# Patient Record
Sex: FEMALE | Race: WHITE | NOT HISPANIC OR LATINO | Employment: FULL TIME | URBAN - METROPOLITAN AREA
[De-identification: names, ages, dates, MRNs, and addresses within clinical notes are randomized per-mention and may not be internally consistent; named-entity substitution may affect disease eponyms.]

---

## 2022-05-29 ENCOUNTER — HOSPITAL ENCOUNTER (EMERGENCY)
Facility: MEDICAL CENTER | Age: 37
End: 2022-05-29
Attending: EMERGENCY MEDICINE
Payer: COMMERCIAL

## 2022-05-29 VITALS
BODY MASS INDEX: 23.14 KG/M2 | SYSTOLIC BLOOD PRESSURE: 97 MMHG | HEART RATE: 87 BPM | WEIGHT: 138.89 LBS | TEMPERATURE: 98 F | OXYGEN SATURATION: 97 % | DIASTOLIC BLOOD PRESSURE: 59 MMHG | HEIGHT: 65 IN | RESPIRATION RATE: 15 BRPM

## 2022-05-29 DIAGNOSIS — U07.1 COVID-19: ICD-10-CM

## 2022-05-29 PROCEDURE — 99284 EMERGENCY DEPT VISIT MOD MDM: CPT

## 2022-05-29 PROCEDURE — 700111 HCHG RX REV CODE 636 W/ 250 OVERRIDE (IP): Performed by: EMERGENCY MEDICINE

## 2022-05-29 PROCEDURE — M0222 HCHG BEBTELOVIMAB ADMINISTRATION: HCPCS

## 2022-05-29 RX ORDER — BEBTELOVIMAB 87.5 MG/ML
175 INJECTION, SOLUTION INTRAVENOUS ONCE
Status: COMPLETED | OUTPATIENT
Start: 2022-05-29 | End: 2022-05-29

## 2022-05-29 RX ORDER — ONDANSETRON 4 MG/1
4 TABLET, ORALLY DISINTEGRATING ORAL EVERY 8 HOURS PRN
Qty: 10 TABLET | Refills: 0 | Status: SHIPPED | OUTPATIENT
Start: 2022-05-29

## 2022-05-29 RX ADMIN — BEBTELOVIMAB 175 MG: 87.5 INJECTION, SOLUTION INTRAVENOUS at 11:36

## 2022-05-29 NOTE — ED TRIAGE NOTES
Amb to triage w/ c/o cough and n/v x 1 day.  + covid test yesterday.  Sent here by her OB due to being 19 wks and 4 days pregnant, advanced maternal age.  Denies any pregnancy related issues.

## 2022-05-29 NOTE — ED PROVIDER NOTES
"ED Provider Note    CHIEF COMPLAINT  Chief Complaint   Patient presents with   • Cough   • N/V       HPI  Megan Eckert is a 37 y.o. female who presents to the emergency department with cough, congestion, runny nose and posttussive emesis as well as isolated emesis.  Patient is 19 weeks 4 days pregnant.  She is from out of [facility].  She started getting sick 2 days ago.  Took a home COVID test which was positive.  Her 's COVID test was also positive.  She has a sick child at home.  She called her OB who advised her to come to the hospital for monoclonal antibodies or evaluation.  States she feels some tightness when she takes a deep breath mostly because it makes her cough when she breathes deeply.  No hemoptysis.  Denies abdominal pain, vaginal bleeding or fluid loss.  No dysuria hematuria frequency.    REVIEW OF SYSTEMS  As per HPI, otherwise a 10 point review of systems is negative    PAST MEDICAL HISTORY  Past Medical History:   Diagnosis Date   • POTS (postural orthostatic tachycardia syndrome)        SOCIAL HISTORY  Social History     Tobacco Use   • Smoking status: Never Smoker   Substance Use Topics   • Alcohol use: Not Currently   • Drug use: Never       SURGICAL HISTORY  History reviewed. No pertinent surgical history.    CURRENT MEDICATIONS  Home Medications    **Home medications have not yet been reviewed for this encounter**         ALLERGIES  Allergies   Allergen Reactions   • Codeine        PHYSICAL EXAM  VITAL SIGNS: /58   Pulse (!) 108   Temp 37 °C (98.6 °F) (Temporal)   Resp 16   Ht 1.651 m (5' 5\")   Wt 63 kg (138 lb 14.2 oz)   SpO2 98%   BMI 23.11 kg/m²    Constitutional: Awake and alert.  Frequent dry cough noted  HENT: Normal inspection  Eyes: Normal inspection  Neck: Grossly normal range of motion.  Cardiovascular: Normal heart rate  Thorax & Lungs: No respiratory distress, No wheezing, No rales, No rhonchi, No chest tenderness.   Abdomen: Bowel sounds normal, soft, " non-distended, gravid nontender uterus.  Bedside ultrasound shows normal fetal heart tones and movement  Skin: No obvious rash.  Extremities: No clubbing, cyanosis, edema, no Homans or cords.  Neurologic: Grossly normal   Psychiatric: Normal for situation    Medications   bebtelovimab injection 175 mg (has no administration in time range)     COURSE & MEDICAL DECISION MAKING  Patient presents with COVID-19.  Vital signs are stable.  She has a benign exam.  She is currently pregnant therefore placed in high risk category.  Discussed risk benefits of monoclonal antibody therapy.  She would like to proceed.  She is given handout on monoclonal antibody.  she will receive her infusion and be discharged.  She described having some nausea.  She has doxylamine at home.  I have encouraged her to continue that but have also given her prescription for Zofran should her nausea be uncontrolled.  Advised plenty of fluids and immune supplement.  Return to ER for difficulty breathing, pain, high uncontrolled fever, pregnancy complications or concern.      FINAL IMPRESSION  1.  COVID-19  2.  19-week pregnancy without evidence of complication      This dictation was created using voice recognition software. The accuracy of the dictation is limited to the abilities of the software.  The nursing notes were reviewed and certain aspects of this information were incorporated into this note.      Electronically signed by: Adalid Collado M.D., 5/29/2022 10:47 AM

## 2022-05-29 NOTE — ED NOTES
No s/sx of reaction from medication administration. Pt provided with discharge instructions. Pt verbalized understanding. PIV removed and pt assisted out of ed with steady gait